# Patient Record
Sex: MALE | Race: WHITE | NOT HISPANIC OR LATINO | Employment: UNEMPLOYED | URBAN - METROPOLITAN AREA
[De-identification: names, ages, dates, MRNs, and addresses within clinical notes are randomized per-mention and may not be internally consistent; named-entity substitution may affect disease eponyms.]

---

## 2018-06-14 ENCOUNTER — HOSPITAL ENCOUNTER (EMERGENCY)
Facility: HOSPITAL | Age: 38
Discharge: HOME | End: 2018-06-14
Attending: EMERGENCY MEDICINE

## 2018-06-14 VITALS
SYSTOLIC BLOOD PRESSURE: 113 MMHG | RESPIRATION RATE: 16 BRPM | DIASTOLIC BLOOD PRESSURE: 73 MMHG | BODY MASS INDEX: 25.05 KG/M2 | WEIGHT: 175 LBS | HEART RATE: 120 BPM | TEMPERATURE: 99 F | HEIGHT: 70 IN | OXYGEN SATURATION: 97 %

## 2018-06-14 DIAGNOSIS — S02.609D: Primary | ICD-10-CM

## 2018-06-14 PROCEDURE — 99283 EMERGENCY DEPT VISIT LOW MDM: CPT

## 2018-06-14 RX ORDER — HYDROCODONE BITARTRATE AND ACETAMINOPHEN 7.5; 325 MG/1; MG/1
1 TABLET ORAL EVERY 8 HOURS PRN
Qty: 15 TABLET | Refills: 0 | Status: SHIPPED | OUTPATIENT
Start: 2018-06-14 | End: 2018-06-19

## 2018-06-14 RX ORDER — IBUPROFEN 800 MG/1
800 TABLET ORAL EVERY 6 HOURS PRN
Qty: 20 TABLET | Refills: 0 | Status: SHIPPED | OUTPATIENT
Start: 2018-06-14 | End: 2023-01-08 | Stop reason: SDUPTHER

## 2018-06-14 RX ORDER — CHLORHEXIDINE GLUCONATE ORAL RINSE 1.2 MG/ML
15 SOLUTION DENTAL 2 TIMES DAILY
COMMUNITY
End: 2023-05-26

## 2018-06-14 RX ORDER — CLINDAMYCIN HYDROCHLORIDE 150 MG/1
150 CAPSULE ORAL EVERY 6 HOURS
COMMUNITY
End: 2023-05-26
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
## 2023-01-08 ENCOUNTER — HOSPITAL ENCOUNTER (EMERGENCY)
Facility: HOSPITAL | Age: 43
Discharge: HOME | End: 2023-01-08
Attending: EMERGENCY MEDICINE
Payer: COMMERCIAL

## 2023-01-08 VITALS
HEART RATE: 110 BPM | BODY MASS INDEX: 24.5 KG/M2 | RESPIRATION RATE: 18 BRPM | TEMPERATURE: 99 F | WEIGHT: 175 LBS | DIASTOLIC BLOOD PRESSURE: 80 MMHG | HEIGHT: 71 IN | OXYGEN SATURATION: 99 % | SYSTOLIC BLOOD PRESSURE: 125 MMHG

## 2023-01-08 DIAGNOSIS — R07.9 CHEST PAIN: ICD-10-CM

## 2023-01-08 DIAGNOSIS — R07.89 CHRONIC CHEST WALL PAIN: Primary | ICD-10-CM

## 2023-01-08 DIAGNOSIS — G89.29 CHRONIC CHEST WALL PAIN: Primary | ICD-10-CM

## 2023-01-08 PROCEDURE — 87389 HIV-1 AG W/HIV-1&-2 AB AG IA: CPT | Performed by: EMERGENCY MEDICINE

## 2023-01-08 PROCEDURE — 99284 EMERGENCY DEPT VISIT MOD MDM: CPT

## 2023-01-08 PROCEDURE — 36415 COLL VENOUS BLD VENIPUNCTURE: CPT | Performed by: EMERGENCY MEDICINE

## 2023-01-08 PROCEDURE — 86803 HEPATITIS C AB TEST: CPT | Performed by: EMERGENCY MEDICINE

## 2023-01-08 PROCEDURE — 93005 ELECTROCARDIOGRAM TRACING: CPT

## 2023-01-08 PROCEDURE — 93010 EKG 12-LEAD: ICD-10-PCS | Mod: ,,, | Performed by: INTERNAL MEDICINE

## 2023-01-08 PROCEDURE — 93010 ELECTROCARDIOGRAM REPORT: CPT | Mod: ,,, | Performed by: INTERNAL MEDICINE

## 2023-01-08 RX ORDER — IBUPROFEN 800 MG/1
800 TABLET ORAL EVERY 6 HOURS PRN
Qty: 20 TABLET | Refills: 0 | Status: SHIPPED | OUTPATIENT
Start: 2023-01-08 | End: 2023-03-14

## 2023-01-08 NOTE — LETTER
January 16, 2023         [address] [place] [id number]  Phone: [phone]       Patient: Moses Chua   YOB: 1980  Date of Visit: 01/16/2023    We have been attempting to reach you regarding results from a recent Emergency Room visit. Please call us at the number listed above to discuss these results.    Thank you

## 2023-01-09 LAB
HCV AB SERPL QL IA: REACTIVE
HIV 1+2 AB+HIV1 P24 AG SERPL QL IA: NORMAL

## 2023-01-09 NOTE — ED PROVIDER NOTES
Encounter Date: 1/8/2023    SCRIBE #1 NOTE: I, Kory Garayh, am scribing for, and in the presence of,  Reynodl Venegas MD.     History     Chief Complaint   Patient presents with    Chest Pain     Pt fell out of bed and landed on heater injuring sternum in November. States she was given abx and anti inflammatories at [facility]. Pain is still present today.      Time seen by provider: 6:15 PM on 01/08/2023    Moses Chua is a 42 y.o. male who presents to the ED with an onset of sternal pain that began a month and a half ago after falling out of his bed and landing on his heater. He has been seen for the injury before and states the pain has persisted. The patient denies any other symptoms at this time. PMHx of IV drug use and multiple traumas. There is no pertinent PSHx.    The history is provided by the patient.   Review of patient's allergies indicates:   Allergen Reactions    Amoxicillin      Past Medical History:   Diagnosis Date    Depression      Past Surgical History:   Procedure Laterality Date    FRACTURE SURGERY      left arm with hardware placement     History reviewed. No pertinent family history.  Social History     Tobacco Use    Smoking status: Every Day     Packs/day: 1.00     Types: Cigarettes   Substance Use Topics    Alcohol use: No    Drug use: Yes     Frequency: 7.0 times per week     Types: Marijuana, IV     Comment: heroin     Review of Systems   Constitutional:  Negative for fever.   HENT:  Negative for sore throat.    Respiratory:  Negative for shortness of breath.    Cardiovascular:  Negative for chest pain.   Gastrointestinal:  Negative for nausea.   Genitourinary:  Negative for dysuria.   Musculoskeletal:  Positive for arthralgias. Negative for back pain.   Skin:  Negative for rash.   Neurological:  Negative for weakness.   Hematological:  Does not bruise/bleed easily.     Physical Exam     Initial Vitals [01/08/23 1747]   BP Pulse Resp Temp SpO2   125/80 110 18 98.5 °F (36.9 °C) 99 %       MAP       --         Physical Exam    Nursing note and vitals reviewed.  Constitutional: Vital signs are normal. He appears well-developed and well-nourished.  Non-toxic appearance. No distress.   HENT:   Head: Normocephalic and atraumatic.   Eyes: EOM are normal. Pupils are equal, round, and reactive to light.   Neck: Neck supple. No JVD present.   Normal range of motion.  Cardiovascular:  Normal rate, regular rhythm, normal heart sounds and intact distal pulses.     Exam reveals no gallop and no friction rub.       No murmur heard.  Pulmonary/Chest: Breath sounds normal. He has no wheezes. He has no rhonchi. He has no rales. He exhibits tenderness (sternal).   Equal breath sounds bilaterally.   Abdominal: Abdomen is soft. Bowel sounds are normal. There is no abdominal tenderness. There is no rebound and no guarding.   Musculoskeletal:         General: Normal range of motion.      Cervical back: Normal range of motion and neck supple. No rigidity.      Comments: Track sahara across arms bilaterally with no bruising.     Neurological: He is alert and oriented to person, place, and time. He has normal strength and normal reflexes. No cranial nerve deficit or sensory deficit. He exhibits normal muscle tone. Coordination normal. GCS eye subscore is 4. GCS verbal subscore is 5. GCS motor subscore is 6.   Skin: Skin is warm and dry.   Psychiatric: He has a normal mood and affect. His speech is normal and behavior is normal. He is not actively hallucinating.       ED Course   Procedures  Labs Reviewed   HEPATITIS C ANTIBODY - Abnormal; Notable for the following components:       Result Value    Hepatitis C Ab Reactive (*)     All other components within normal limits    Narrative:     Release to patient->Immediate   HIV 1 / 2 ANTIBODY    Narrative:     Release to patient->Immediate     EKG Readings: (Independently Interpreted)   Sinus tachycardia at rate of 106 bpm with normal axis and normal intervals. No acute ST  segment changes.      ECG Results              EKG 12-lead (In process)  Result time 01/09/23 09:12:04      In process by Interface, Lab In [facility] (01/09/23 09:12:04)                   Narrative:    Test Reason : R07.9,    Vent. Rate : 106 BPM     Atrial Rate : 106 BPM     P-R Int : 148 ms          QRS Dur : 078 ms      QT Int : 330 ms       P-R-T Axes : 084 063 035 degrees     QTc Int : 438 ms    Sinus tachycardia  Otherwise normal ECG  No previous ECGs available    Referred By: [id number]   SELF           Confirmed By:                                   Imaging Results    None          Medications - No data to display  Medical Decision Making:   History:   Old Medical Records: I decided to obtain old medical records.  Initial Assessment:   43 yo man with sternal pain that began a month and a half ago after falling out of his bed and landing on his heater. He has been seen for the injury before and states the pain has persisted. The patient denies any other symptoms at this time. His chest pain is completely reproducible and seems musculoskeletal. EKG is normal with mild sinus tachycardia 106 bpm. No pulses alterans or hypotension to suspect cardiac tamponade. No hypoxia, resp distress, and he has equal lung sounds. I doubt tension pneumothorax. Pain is chronic and no acute ischemia on EKg. Doubt ACS. Suggest NSAIDS and close PCP fu.   Independently Interpreted Test(s):   I have ordered and independently interpreted EKG Reading(s) - see prior notes  Clinical Tests:   Medical Tests: Ordered and Reviewed        Scribe Attestation:   Scribe #1: I performed the above scribed service and the documentation accurately describes the services I performed. I attest to the accuracy of the note.            I, Theo Cheema, personally performed the services described in this documentation. All medical record entries made by the scribe were at my direction and in my presence.  I have reviewed the chart and agree that the  record reflects my personal performance and is accurate and complete. Reynold Venegas MD.  11:43 AM 01/10/2023       DISCLAIMER: This note was prepared with [employer] Direct voice recognition transcription software. Garbled syntax, mangled pronouns, and other bizarre constructions may be attributed to that software system.         Clinical Impression:   Final diagnoses:  [R07.9] Chest pain  [R07.89, G89.29] Chronic chest wall pain (Primary)        ED Disposition Condition    Discharge Stable          ED Prescriptions       Medication Sig Dispense Start Date End Date Auth. Provider    ibuprofen (ADVIL,MOTRIN) 800 MG tablet Take 1 tablet (800 mg total) by mouth every 6 (six) hours as needed. 20 tablet 1/8/2023 -- Reynold Venegas MD          Follow-up Information       Follow up With Specialties Details Why Contact [facility]  Schedule an appointment as soon as possible for a visit   [address] [place] [address]  [phone]    [facility] Emergency Medicine  As needed, If symptoms worsen [address] [place] [id number] [phone]             Reynold Venegas MD  01/10/23 3401

## 2023-01-24 DIAGNOSIS — R76.8 HEPATITIS C ANTIBODY TEST POSITIVE: Primary | ICD-10-CM

## 2023-02-15 ENCOUNTER — LAB VISIT (OUTPATIENT)
Dept: LAB | Facility: HOSPITAL | Age: 43
End: 2023-02-15
Attending: PHYSICIAN ASSISTANT
Payer: COMMERCIAL

## 2023-02-15 DIAGNOSIS — R76.8 HEPATITIS C ANTIBODY TEST POSITIVE: ICD-10-CM

## 2023-02-15 PROCEDURE — 87522 HEPATITIS C REVRS TRNSCRPJ: CPT | Performed by: PHYSICIAN ASSISTANT

## 2023-02-15 PROCEDURE — 36415 COLL VENOUS BLD VENIPUNCTURE: CPT | Performed by: PHYSICIAN ASSISTANT

## 2023-02-16 LAB
HCV RNA SERPL NAA+PROBE-LOG IU: 7.06 LOGIU/ML
HCV RNA SERPL QL NAA+PROBE: DETECTED
HCV RNA SPEC NAA+PROBE-ACNC: ABNORMAL IU/ML

## 2023-03-14 ENCOUNTER — OFFICE VISIT (OUTPATIENT)
Dept: FAMILY MEDICINE | Facility: CLINIC | Age: 43
End: 2023-03-14
Payer: COMMERCIAL

## 2023-03-14 VITALS
SYSTOLIC BLOOD PRESSURE: 102 MMHG | HEART RATE: 87 BPM | DIASTOLIC BLOOD PRESSURE: 70 MMHG | OXYGEN SATURATION: 95 % | HEIGHT: 71 IN | TEMPERATURE: 98 F | BODY MASS INDEX: 27.19 KG/M2 | WEIGHT: 194.25 LBS

## 2023-03-14 DIAGNOSIS — B17.10 ACUTE HEPATITIS C VIRUS INFECTION WITHOUT HEPATIC COMA: ICD-10-CM

## 2023-03-14 DIAGNOSIS — Z00.00 ENCOUNTER FOR MEDICAL EXAMINATION TO ESTABLISH CARE: Primary | ICD-10-CM

## 2023-03-14 PROCEDURE — 3074F SYST BP LT 130 MM HG: CPT | Mod: CPTII,,, | Performed by: FAMILY MEDICINE

## 2023-03-14 PROCEDURE — 3008F BODY MASS INDEX DOCD: CPT | Mod: CPTII,,, | Performed by: FAMILY MEDICINE

## 2023-03-14 PROCEDURE — 99203 PR OFFICE/OUTPT VISIT, NEW, LEVL III, 30-44 MIN: ICD-10-PCS | Mod: ,,, | Performed by: FAMILY MEDICINE

## 2023-03-14 PROCEDURE — 3078F PR MOST RECENT DIASTOLIC BLOOD PRESSURE < 80 MM HG: ICD-10-PCS | Mod: CPTII,,, | Performed by: FAMILY MEDICINE

## 2023-03-14 PROCEDURE — 3008F PR BODY MASS INDEX (BMI) DOCUMENTED: ICD-10-PCS | Mod: CPTII,,, | Performed by: FAMILY MEDICINE

## 2023-03-14 PROCEDURE — 3078F DIAST BP <80 MM HG: CPT | Mod: CPTII,,, | Performed by: FAMILY MEDICINE

## 2023-03-14 PROCEDURE — 1159F PR MEDICATION LIST DOCUMENTED IN MEDICAL RECORD: ICD-10-PCS | Mod: CPTII,,, | Performed by: FAMILY MEDICINE

## 2023-03-14 PROCEDURE — 1159F MED LIST DOCD IN RCRD: CPT | Mod: CPTII,,, | Performed by: FAMILY MEDICINE

## 2023-03-14 PROCEDURE — 99203 OFFICE O/P NEW LOW 30 MIN: CPT | Mod: ,,, | Performed by: FAMILY MEDICINE

## 2023-03-14 PROCEDURE — 3074F PR MOST RECENT SYSTOLIC BLOOD PRESSURE < 130 MM HG: ICD-10-PCS | Mod: CPTII,,, | Performed by: FAMILY MEDICINE

## 2023-03-14 NOTE — PROGRESS NOTES
Subjective:       Patient ID: Moses Negron is a 42 y.o. male.    Chief Complaint: Establish Care (Pt here to [facility] care/ Went to ER 1-8-23, here for f/u and recently diagnosed with Hep C)    Mr. NEGRON is a 42-year-old male who presents today to establish care.  He has been seen in the ER multiple issues including abscesses of the forearm numerous times in 2022, as well as sternal chest pain after falling out of bed and landing on a heater.  His last visit to the ER for sternal pain was in January 8th of 2023.  At that ER visit he was tested for hep C and was found to be positive.  He is here today to get a referral to a hepatologist.    He has a past medical history of extensive IV drug use with track marks on bilateral arms and forearms some of which appear fairly recent.  When asked what these marks were on his arms he told me bug bites.  I noted that they looked like track marks from previous drug use and he said yes he used IV drugs in the past.  He stated he has not done heroin in approximately 1 month.    Of concern is that there are no labs on file to evaluate this patient nor chest x-ray, EKG, etc. I will order an EKG a chest x-ray, as the patient is still having significant chest pain due to the contusion he suffered last fall.  However, I am worried that this may have some relation to his chronic drug use and recent infections so we will do an EKG also as well as routine labs to evaluate for pericarditis, etcetera    Review of Systems   Cardiovascular:  Positive for chest pain.        Chronic sternal chest pain   Musculoskeletal:  Positive for arthralgias and myalgias.   Psychiatric/Behavioral:          Chronic iv drug use     There is no problem list on file for this patient.      Objective:      Physical Exam  HENT:      Head: Normocephalic and atraumatic.      Nose: Nose normal.   Eyes:      Extraocular Movements: Extraocular movements intact.      Pupils: Pupils are equal, round, and reactive to light.  "  Cardiovascular:      Rate and Rhythm: Normal rate and regular rhythm.   Pulmonary:      Effort: Pulmonary effort is normal.      Breath sounds: Normal breath sounds.   Skin:     Findings: Lesion present.      Comments: Bilateral arms and forearms with extensive track marks from iv drug use   Neurological:      Mental Status: He is alert.       Lab Results   Component Value Date    WBC 14.90 (H) 07/15/2014    HGB 16.1 07/15/2014    HCT 48.3 07/15/2014     07/15/2014    ALT 55 (H) 07/15/2014    AST 20 07/15/2014     07/15/2014    K 4.6 07/15/2014    CL 98 07/15/2014    CREATININE 1.0 07/15/2014    BUN 18 07/15/2014    CO2 25 07/15/2014     The ASCVD Risk score (Ron SAEZ, et al., 2019) failed to calculate for the following reasons:    Cannot find a previous HDL lab    Cannot find a previous total cholesterol lab  Visit Vitals  /70 (BP Location: Left arm, Patient Position: Sitting, BP Method: Large (Manual))   Pulse 87   Temp 98.1 °F (36.7 °C)   Ht 5' 11" (1.803 m)   Wt 88.1 kg (194 lb 3.6 oz)   SpO2 95%   BMI 27.09 kg/m²      Assessment:       1. Encounter for medical examination to establish care    2. Acute hepatitis C virus infection without hepatic coma        Plan:       1. Encounter for medical examination to establish care  -     Lipid Panel; Future; Expected date: 03/14/2023  -     CBC Auto Differential; Future; Expected date: 03/14/2023  -     Comprehensive Metabolic Panel; Future; Expected date: 03/14/2023  -     Hemoglobin A1C; Future; Expected date: 03/14/2023  -     TSH; Future; Expected date: 03/14/2023  -     PSA, Screening; Future; Expected date: 03/14/2023  -     IN OFFICE EKG 12-LEAD (to Muse)    2. Acute hepatitis C virus infection without hepatic coma  -     Ambulatory referral/consult to Hepatology; Future; Expected date: 03/21/2023       Follow up in about 3 months (around 6/14/2023), or if symptoms worsen or fail to improve.           "

## 2023-03-15 ENCOUNTER — TELEPHONE (OUTPATIENT)
Dept: HEPATOLOGY | Facility: CLINIC | Age: 43
End: 2023-03-15
Payer: COMMERCIAL

## 2023-03-15 NOTE — TELEPHONE ENCOUNTER
Dr. Maricarmen Bernard ordered that patient be scheduled for a hepatology consult visit for hep c.  Patient quant positive.  Attempt made to reach patient to setup consult visit with PA Scheuermann.  DILSHADM asking that patient call hepatology back for scheduling.

## 2023-03-21 ENCOUNTER — TELEPHONE (OUTPATIENT)
Dept: HEPATOLOGY | Facility: CLINIC | Age: 43
End: 2023-03-21
Payer: COMMERCIAL

## 2023-03-21 NOTE — TELEPHONE ENCOUNTER
----- Message from Nayely Ching sent at 3/21/2023  3:42 PM CDT -----  Regarding: FW: Call Back  Hi Rhoda,    This is Lillie's patient returning a call back to you . Patient (MRN# [id number]) Moses Harshil.    Thanks,  Kajal  ----- Message -----  From: Alexandrea Gatica  Sent: 3/21/2023  11:15 AM CDT  To: [facility] Hepatology Scheduling  Subject: Call Back                                        Pt returning call to Nayely. They need to schedule an appt. Best time to reach pt would be after 4 pm.      Moses @ [phone]

## 2023-05-02 ENCOUNTER — TELEPHONE (OUTPATIENT)
Dept: HEPATOLOGY | Facility: CLINIC | Age: 43
End: 2023-05-02
Payer: COMMERCIAL

## 2023-05-02 DIAGNOSIS — B18.2 CHRONIC HEPATITIS C WITHOUT HEPATIC COMA: Primary | ICD-10-CM

## 2023-05-02 NOTE — TELEPHONE ENCOUNTER
Pt arrived over 45 min late for appt  (New pt for HCV)    Unable to accommodate him today  Will r/s appt w/ pre appt labs / imaging    Orders Placed This Encounter   Procedures    FibroScan (Vibration Controlled Transient Elastography)    US Abdomen Limited    CBC Without Differential    Comprehensive Metabolic Panel    Protime-INR    Hepatitis C Genotype    Hepatitis B Surface Antigen    Hepatitis B Surface Ab, Qualitative    Hepatitis B Core Antibody, Total    Hepatitis A antibody, IgG

## 2023-05-02 NOTE — TELEPHONE ENCOUNTER
LVM appt reminder on 5/1/23.  Patient was a no-show for scheduled appt with PA Scheuermann on 5/2/23.  Letter sent asking that he call hepatology for rescheduling.

## 2023-05-12 NOTE — TELEPHONE ENCOUNTER
I spoke with patient.  US and labs scheduled 5/20/23 and appt with PA Scheuermann and fibroscan scheduled 5/26/23.

## 2023-05-24 ENCOUNTER — HOSPITAL ENCOUNTER (OUTPATIENT)
Dept: RADIOLOGY | Facility: HOSPITAL | Age: 43
Discharge: HOME | End: 2023-05-24
Attending: PHYSICIAN ASSISTANT
Payer: COMMERCIAL

## 2023-05-24 DIAGNOSIS — B18.2 CHRONIC HEPATITIS C WITHOUT HEPATIC COMA: ICD-10-CM

## 2023-05-24 PROCEDURE — 76705 US ABDOMEN LIMITED: ICD-10-PCS | Mod: 26,,, | Performed by: RADIOLOGY

## 2023-05-24 PROCEDURE — 76705 ECHO EXAM OF ABDOMEN: CPT | Mod: TC

## 2023-05-24 PROCEDURE — 76705 ECHO EXAM OF ABDOMEN: CPT | Mod: 26,,, | Performed by: RADIOLOGY

## 2023-05-26 ENCOUNTER — OFFICE VISIT (OUTPATIENT)
Dept: HEPATOLOGY | Facility: CLINIC | Age: 43
End: 2023-05-26
Payer: COMMERCIAL

## 2023-05-26 ENCOUNTER — PROCEDURE VISIT (OUTPATIENT)
Dept: HEPATOLOGY | Facility: CLINIC | Age: 43
End: 2023-05-26
Payer: COMMERCIAL

## 2023-05-26 VITALS — BODY MASS INDEX: 27.16 KG/M2 | HEIGHT: 71 IN | WEIGHT: 194 LBS

## 2023-05-26 DIAGNOSIS — B17.10 ACUTE HEPATITIS C VIRUS INFECTION WITHOUT HEPATIC COMA: ICD-10-CM

## 2023-05-26 DIAGNOSIS — B18.2 CHRONIC HEPATITIS C WITHOUT HEPATIC COMA: ICD-10-CM

## 2023-05-26 DIAGNOSIS — R76.8 HEPATITIS B SURFACE ANTIGEN POSITIVE: ICD-10-CM

## 2023-05-26 DIAGNOSIS — B18.2 CHRONIC HEPATITIS C WITHOUT HEPATIC COMA: Primary | ICD-10-CM

## 2023-05-26 PROCEDURE — 1160F PR REVIEW ALL MEDS BY PRESCRIBER/CLIN PHARMACIST DOCUMENTED: ICD-10-PCS | Mod: CPTII,S$GLB,, | Performed by: PHYSICIAN ASSISTANT

## 2023-05-26 PROCEDURE — 3044F PR MOST RECENT HEMOGLOBIN A1C LEVEL <7.0%: ICD-10-PCS | Mod: CPTII,S$GLB,, | Performed by: PHYSICIAN ASSISTANT

## 2023-05-26 PROCEDURE — 1160F RVW MEDS BY RX/DR IN RCRD: CPT | Mod: CPTII,S$GLB,, | Performed by: PHYSICIAN ASSISTANT

## 2023-05-26 PROCEDURE — 3044F HG A1C LEVEL LT 7.0%: CPT | Mod: CPTII,S$GLB,, | Performed by: PHYSICIAN ASSISTANT

## 2023-05-26 PROCEDURE — 99204 OFFICE O/P NEW MOD 45 MIN: CPT | Mod: S$GLB,,, | Performed by: PHYSICIAN ASSISTANT

## 2023-05-26 PROCEDURE — 76981 FIBROSCAN (VIBRATION CONTROLLED TRANSIENT ELASTOGRAPHY): ICD-10-PCS | Mod: S$GLB,,, | Performed by: PHYSICIAN ASSISTANT

## 2023-05-26 PROCEDURE — 3008F PR BODY MASS INDEX (BMI) DOCUMENTED: ICD-10-PCS | Mod: CPTII,S$GLB,, | Performed by: PHYSICIAN ASSISTANT

## 2023-05-26 PROCEDURE — 1159F MED LIST DOCD IN RCRD: CPT | Mod: CPTII,S$GLB,, | Performed by: PHYSICIAN ASSISTANT

## 2023-05-26 PROCEDURE — 99999 PR PBB SHADOW E&M-EST. PATIENT-LVL III: ICD-10-PCS | Mod: PBBFAC,,, | Performed by: PHYSICIAN ASSISTANT

## 2023-05-26 PROCEDURE — 76981 USE PARENCHYMA: CPT | Mod: S$GLB,,, | Performed by: PHYSICIAN ASSISTANT

## 2023-05-26 PROCEDURE — 3008F BODY MASS INDEX DOCD: CPT | Mod: CPTII,S$GLB,, | Performed by: PHYSICIAN ASSISTANT

## 2023-05-26 PROCEDURE — 1159F PR MEDICATION LIST DOCUMENTED IN MEDICAL RECORD: ICD-10-PCS | Mod: CPTII,S$GLB,, | Performed by: PHYSICIAN ASSISTANT

## 2023-05-26 PROCEDURE — 99204 PR OFFICE/OUTPT VISIT, NEW, LEVL IV, 45-59 MIN: ICD-10-PCS | Mod: S$GLB,,, | Performed by: PHYSICIAN ASSISTANT

## 2023-05-26 PROCEDURE — 99999 PR PBB SHADOW E&M-EST. PATIENT-LVL III: CPT | Mod: PBBFAC,,, | Performed by: PHYSICIAN ASSISTANT
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
## 2023-05-29 ENCOUNTER — TELEPHONE (OUTPATIENT)
Dept: HEPATOLOGY | Facility: CLINIC | Age: 43
End: 2023-05-29
Payer: COMMERCIAL

## 2023-05-29 ENCOUNTER — TELEPHONE (OUTPATIENT)
Dept: FAMILY MEDICINE | Facility: CLINIC | Age: 43
End: 2023-05-29
Payer: COMMERCIAL

## 2023-05-29 NOTE — TELEPHONE ENCOUNTER
Attempt made to reach patient.  Unable to connect to his number.  Letter sent asking that he contact hepatology for scheduling.

## 2023-05-29 NOTE — TELEPHONE ENCOUNTER
----- Message from Jennifer B. Scheuermann, PA sent at 5/26/2023  5:01 PM CDT -----  Pls schedule labs. Schedule f/u visit w/ me in [place]. Needs to be after lab results available.

## 2023-05-31 ENCOUNTER — OFFICE VISIT (OUTPATIENT)
Dept: FAMILY MEDICINE | Facility: CLINIC | Age: 43
End: 2023-05-31
Payer: COMMERCIAL

## 2023-05-31 VITALS
RESPIRATION RATE: 18 BRPM | HEART RATE: 95 BPM | BODY MASS INDEX: 27.72 KG/M2 | DIASTOLIC BLOOD PRESSURE: 72 MMHG | WEIGHT: 198 LBS | OXYGEN SATURATION: 99 % | HEIGHT: 71 IN | SYSTOLIC BLOOD PRESSURE: 130 MMHG

## 2023-05-31 DIAGNOSIS — R73.03 PREDIABETES: ICD-10-CM

## 2023-05-31 DIAGNOSIS — E66.3 OVERWEIGHT (BMI 25.0-29.9): ICD-10-CM

## 2023-05-31 DIAGNOSIS — R79.89 ELEVATED TSH: Primary | ICD-10-CM

## 2023-05-31 PROBLEM — S02.601A CLOSED FRACTURE OF RIGHT SIDE OF MANDIBULAR BODY: Status: ACTIVE | Noted: 2018-06-11

## 2023-05-31 PROBLEM — F19.10 POLYSUBSTANCE ABUSE: Status: ACTIVE | Noted: 2018-06-11

## 2023-05-31 PROCEDURE — 3078F PR MOST RECENT DIASTOLIC BLOOD PRESSURE < 80 MM HG: ICD-10-PCS | Mod: CPTII,,, | Performed by: FAMILY MEDICINE

## 2023-05-31 PROCEDURE — 3044F PR MOST RECENT HEMOGLOBIN A1C LEVEL <7.0%: ICD-10-PCS | Mod: CPTII,,, | Performed by: FAMILY MEDICINE

## 2023-05-31 PROCEDURE — 3008F BODY MASS INDEX DOCD: CPT | Mod: CPTII,,, | Performed by: FAMILY MEDICINE

## 2023-05-31 PROCEDURE — 3008F PR BODY MASS INDEX (BMI) DOCUMENTED: ICD-10-PCS | Mod: CPTII,,, | Performed by: FAMILY MEDICINE

## 2023-05-31 PROCEDURE — 1160F PR REVIEW ALL MEDS BY PRESCRIBER/CLIN PHARMACIST DOCUMENTED: ICD-10-PCS | Mod: CPTII,,, | Performed by: FAMILY MEDICINE

## 2023-05-31 PROCEDURE — 3075F SYST BP GE 130 - 139MM HG: CPT | Mod: CPTII,,, | Performed by: FAMILY MEDICINE

## 2023-05-31 PROCEDURE — 1160F RVW MEDS BY RX/DR IN RCRD: CPT | Mod: CPTII,,, | Performed by: FAMILY MEDICINE

## 2023-05-31 PROCEDURE — 99212 PR OFFICE/OUTPT VISIT, EST, LEVL II, 10-19 MIN: ICD-10-PCS | Mod: ,,, | Performed by: FAMILY MEDICINE

## 2023-05-31 PROCEDURE — 99212 OFFICE O/P EST SF 10 MIN: CPT | Mod: ,,, | Performed by: FAMILY MEDICINE

## 2023-05-31 PROCEDURE — 3044F HG A1C LEVEL LT 7.0%: CPT | Mod: CPTII,,, | Performed by: FAMILY MEDICINE

## 2023-05-31 PROCEDURE — 1159F PR MEDICATION LIST DOCUMENTED IN MEDICAL RECORD: ICD-10-PCS | Mod: CPTII,,, | Performed by: FAMILY MEDICINE

## 2023-05-31 PROCEDURE — 3075F PR MOST RECENT SYSTOLIC BLOOD PRESS GE 130-139MM HG: ICD-10-PCS | Mod: CPTII,,, | Performed by: FAMILY MEDICINE

## 2023-05-31 PROCEDURE — 3078F DIAST BP <80 MM HG: CPT | Mod: CPTII,,, | Performed by: FAMILY MEDICINE

## 2023-05-31 PROCEDURE — 1159F MED LIST DOCD IN RCRD: CPT | Mod: CPTII,,, | Performed by: FAMILY MEDICINE

## 2023-05-31 NOTE — PROGRESS NOTES
Subjective:       Patient ID: Moses Chua is a 42 y.o. male.    Chief Complaint: Follow-up (Pt presents to the clinic for f/u on labs)    This patient is new to me.  Moses Chua presents to the clinic today for lab review. Previous blood work reviewed with patient in office today. Explained that A1c of 5.9, this is in prediabetic range. This is usually managed with diet and exercise and continue to monitor every 6 months.   TSH is elevated and suggested to repeat this in 3 months as well as a T4 level to assess thyroid function.   Other lab work will be reviewed by hepatology, PA Scheuermann.  Patient educated on plan of care, verbalized understanding.    Review of Systems   Constitutional:  Negative for activity change, appetite change, chills, diaphoresis and fever.   HENT:  Negative for congestion, ear pain, postnasal drip, sinus pressure, sneezing and sore throat.    Eyes:  Negative for pain, discharge, redness and itching.   Respiratory:  Negative for apnea, cough, chest tightness, shortness of breath and wheezing.    Cardiovascular:  Negative for chest pain and leg swelling.   Gastrointestinal:  Negative for abdominal distention, abdominal pain, constipation, diarrhea, nausea and vomiting.   Genitourinary:  Negative for difficulty urinating, dysuria, flank pain and frequency.   Skin:  Negative for color change, rash and wound.   Neurological:  Negative for dizziness.     Patient Active Problem List   Diagnosis    Closed fracture of right side of mandibular body    Humerus fracture    Polysubstance abuse       Objective:      Physical Exam  Vitals reviewed.   Constitutional:       General: He is not in acute distress.     Appearance: Normal appearance. He is well-developed.   HENT:      Head: Normocephalic.      Nose: Nose normal.   Eyes:      Conjunctiva/sclera: Conjunctivae normal.      Pupils: Pupils are equal, round, and reactive to light.   Cardiovascular:      Rate and Rhythm: Normal rate.   Pulmonary:  "     Effort: Pulmonary effort is normal. No respiratory distress.   Musculoskeletal:      Cervical back: Normal range of motion and neck supple.   Skin:     General: Skin is warm and dry.      Findings: No rash.   Neurological:      Mental Status: He is alert and oriented to person, place, and time.   Psychiatric:         Behavior: Behavior normal.       Lab Results   Component Value Date    WBC 6.75 05/24/2023    HGB 11.7 (L) 05/24/2023    HCT 37.7 (L) 05/24/2023     (H) 05/24/2023    CHOL 102 (L) 05/24/2023    TRIG 56 05/24/2023    HDL 24 (L) 05/24/2023    ALT 38 05/24/2023    AST 29 05/24/2023     05/24/2023    K 4.4 05/24/2023     05/24/2023    CREATININE 0.8 05/24/2023    BUN 18 05/24/2023    CO2 25 05/24/2023    TSH 8.157 (H) 05/24/2023    PSA 0.10 05/24/2023    INR 1.1 05/24/2023    HGBA1C 5.9 (H) 05/24/2023     The ASCVD Risk score (Ron SAEZ, et al., 2019) failed to calculate for the following reasons:    The valid total cholesterol range is 130 to 320 mg/dL  Visit Vitals  /72 (BP Location: Right arm, Patient Position: Sitting, BP Method: Medium (Manual))   Pulse 95   Resp 18   Ht 5' 11" (1.803 m)   Wt 89.8 kg (197 lb 15.6 oz)   SpO2 99%   BMI 27.61 kg/m²      Assessment:       1. Elevated TSH    2. Prediabetes    3. Overweight (BMI 25.0-29.9)        Plan:       Moses was seen today for follow-up.    Diagnoses and all orders for this visit:    Elevated TSH  -     TSH; Future  -     T4, free; Future  -     T3; Future  Discussed repeat in 3 months  Continue medications as prescribed.  Follow up with PCP     Prediabetes  Discussed diet and exercise to control  Continue medications as prescribed.  Follow up with PCP     Overweight (BMI 25.0-29.9)  Body mass index is 27.61 kg/m².  Continue healthy diet and regular exercise as tolerated.  Continue medications as prescribed.  Follow up with PCP        Follow up in about 6 months (around 11/30/2023), or if symptoms worsen or fail to " improve.      Future Appointments       Date Provider Specialty Appt Notes    8/31/2023  Lab .     12/1/2023 Maricarmen Bernard MD Family Medicine 6m f/u

## 2023-06-01 ENCOUNTER — TELEPHONE (OUTPATIENT)
Dept: HEPATOLOGY | Facility: CLINIC | Age: 43
End: 2023-06-01
Payer: COMMERCIAL

## 2023-06-01 NOTE — TELEPHONE ENCOUNTER
I spoke with patient and msg from PA Scheuermann relayed.  Lab draw scheduled 6/2/23 and f/u visit scheduled 7/18/23; appt reminder notice mailed.

## 2023-06-01 NOTE — TELEPHONE ENCOUNTER
Pt seen in ofc 5/26 w/ several labs ordered:   HEPATITIS B VIRAL DNA, QUANTITATIVE    Hepatitis B Core Antibody, IgM    Hepatitis B Surface Antigen    Hepatitis B e Antigen    Hepatitis B e antibody     Appears these are scheduled in Aug??? But I'm not sure why  I don't recommend he wait that long. Pls see if labs can be done sooner    Pls schedule labs to be done some time soon  Also needs f/u visit w/ me after results are avail ([place] is fine if easier for him)    thanks

## 2023-06-22 NOTE — PROCEDURES
FibroScan (Vibration Controlled Transient Elastography)    Date/Time: 5/26/2023 2:45 PM  Performed by: Jennifer B. Scheuermann, PA  Authorized by: Jennifer B. Scheuermann, PA     Diagnosis:  HCV    Probe:  M    Universal Protocol: Patient's identity, procedure and site were verified, confirmatory pause was performed.  Discussed procedure including risks and potential complications.  Questions answered.  Patient verbalizes understanding and wishes to proceed with VCTE.     Procedure: After providing explanations of the procedure, patient was placed in the supine position with right arm in maximum abduction to allow optimal exposure of right lateral abdomen.  Patient was briefly assessed, Testing was performed in the mid-axillary location, 50Hz Shear Wave pulses were applied and the resulting Shear Wave and Propagation Speed detected with a 3.5 MHz ultrasonic signal, using the FibroScan probe, Skin to liver capsule distance and liver parenchyma were accessed during the entire examination with the FibroScan probe, Patient was instructed to breathe normally and to abstain from sudden movements during the procedure, allowing for random measurements of liver stiffness. At least 10 Shear Waves were produced, Individual measurements of each Shear Wave were calculated.  Patient tolerated the procedure well with no complications.  Meets discharge criteria as was dismissed.  Rates pain 0 out of 10.  Patient will follow up with ordering provider to review results.    Findings  Median liver stiffness score:  3.3  CAP Reading: dB/m:  226    IQR/med %:  6  Interpretation  Fibrosis interpretation is based on medial liver stiffness - Kilopascal (kPa).    Fibrosis Stage:  F 0-1  Steatosis interpretation is based on controlled attenuation parameter - (dB/m).    Steatosis Grade:  <S1

## 2023-07-18 ENCOUNTER — OFFICE VISIT (OUTPATIENT)
Dept: HEPATOLOGY | Facility: CLINIC | Age: 43
End: 2023-07-18
Payer: COMMERCIAL

## 2023-07-18 ENCOUNTER — LAB VISIT (OUTPATIENT)
Dept: LAB | Facility: HOSPITAL | Age: 43
End: 2023-07-18
Attending: PHYSICIAN ASSISTANT
Payer: COMMERCIAL

## 2023-07-18 VITALS — WEIGHT: 197.06 LBS | BODY MASS INDEX: 28.21 KG/M2 | HEIGHT: 70 IN

## 2023-07-18 DIAGNOSIS — R76.8 HEPATITIS B SURFACE ANTIGEN POSITIVE: ICD-10-CM

## 2023-07-18 DIAGNOSIS — B18.2 CHRONIC HEPATITIS C WITHOUT HEPATIC COMA: ICD-10-CM

## 2023-07-18 DIAGNOSIS — B18.2 CHRONIC HEPATITIS C WITHOUT HEPATIC COMA: Primary | ICD-10-CM

## 2023-07-18 PROCEDURE — 87340 HEPATITIS B SURFACE AG IA: CPT | Performed by: PHYSICIAN ASSISTANT

## 2023-07-18 PROCEDURE — 3044F HG A1C LEVEL LT 7.0%: CPT | Mod: CPTII,S$GLB,, | Performed by: PHYSICIAN ASSISTANT

## 2023-07-18 PROCEDURE — 86705 HEP B CORE ANTIBODY IGM: CPT | Performed by: PHYSICIAN ASSISTANT

## 2023-07-18 PROCEDURE — 86704 HEP B CORE ANTIBODY TOTAL: CPT | Performed by: PHYSICIAN ASSISTANT

## 2023-07-18 PROCEDURE — 1160F PR REVIEW ALL MEDS BY PRESCRIBER/CLIN PHARMACIST DOCUMENTED: ICD-10-PCS | Mod: CPTII,S$GLB,, | Performed by: PHYSICIAN ASSISTANT

## 2023-07-18 PROCEDURE — 3008F PR BODY MASS INDEX (BMI) DOCUMENTED: ICD-10-PCS | Mod: CPTII,S$GLB,, | Performed by: PHYSICIAN ASSISTANT

## 2023-07-18 PROCEDURE — 36415 COLL VENOUS BLD VENIPUNCTURE: CPT | Performed by: PHYSICIAN ASSISTANT

## 2023-07-18 PROCEDURE — 87517 HEPATITIS B DNA QUANT: CPT | Performed by: PHYSICIAN ASSISTANT

## 2023-07-18 PROCEDURE — 99213 PR OFFICE/OUTPT VISIT, EST, LEVL III, 20-29 MIN: ICD-10-PCS | Mod: S$GLB,,, | Performed by: PHYSICIAN ASSISTANT

## 2023-07-18 PROCEDURE — 87350 HEPATITIS BE AG IA: CPT | Performed by: PHYSICIAN ASSISTANT

## 2023-07-18 PROCEDURE — 1160F RVW MEDS BY RX/DR IN RCRD: CPT | Mod: CPTII,S$GLB,, | Performed by: PHYSICIAN ASSISTANT

## 2023-07-18 PROCEDURE — 3008F BODY MASS INDEX DOCD: CPT | Mod: CPTII,S$GLB,, | Performed by: PHYSICIAN ASSISTANT

## 2023-07-18 PROCEDURE — 1159F PR MEDICATION LIST DOCUMENTED IN MEDICAL RECORD: ICD-10-PCS | Mod: CPTII,S$GLB,, | Performed by: PHYSICIAN ASSISTANT

## 2023-07-18 PROCEDURE — 99999 PR PBB SHADOW E&M-EST. PATIENT-LVL II: ICD-10-PCS | Mod: PBBFAC,,, | Performed by: PHYSICIAN ASSISTANT

## 2023-07-18 PROCEDURE — 99999 PR PBB SHADOW E&M-EST. PATIENT-LVL II: CPT | Mod: PBBFAC,,, | Performed by: PHYSICIAN ASSISTANT

## 2023-07-18 PROCEDURE — 99213 OFFICE O/P EST LOW 20 MIN: CPT | Mod: S$GLB,,, | Performed by: PHYSICIAN ASSISTANT

## 2023-07-18 PROCEDURE — 86706 HEP B SURFACE ANTIBODY: CPT | Performed by: PHYSICIAN ASSISTANT

## 2023-07-18 PROCEDURE — 1159F MED LIST DOCD IN RCRD: CPT | Mod: CPTII,S$GLB,, | Performed by: PHYSICIAN ASSISTANT

## 2023-07-18 PROCEDURE — 3044F PR MOST RECENT HEMOGLOBIN A1C LEVEL <7.0%: ICD-10-PCS | Mod: CPTII,S$GLB,, | Performed by: PHYSICIAN ASSISTANT

## 2023-07-18 PROCEDURE — 86707 HEPATITIS BE ANTIBODY: CPT | Performed by: PHYSICIAN ASSISTANT

## 2023-07-18 NOTE — PROGRESS NOTES
"HEPATOLOGY CLINIC VISIT NOTE - HCV clinic  CHIEF COMPLAINT: Hepatitis C     HISTORY       This is a 42 y.o. White male w/ HCV, (+) HBsAg, & substance use d/o, here for f/u    At last visit labs to clarify HBV status were ordered  - not done, "hard stick"  Willing to try again today      HCV history:  Recently diagnosed  Prior icteric illnesses: None  Risks for HCV:  Drug use (ongoing), tattoos  - Treatment naive  - Genotype - unable to be determined  - HCV RNA > 11 mill IU/mL - 2/2023    Liver staging:  FibroScan today - kPa 3.3, F0-1 (, S0)  Labs / imaging support staging    Substance use:  (+) IVDU - heroin, ongoing.  Has previously investigated detox / rehab but decided not to pursue it b/c was told he'd have to go to senior care until a bed was available.        PMH, PSH, SOCIAL HX, FAMILY HX      Reviewed in Epic  Pertinent findings:  FAMILY HX: neg for liver diease  SOCIAL HX: resides in [place]. Lives w/ mother. Works [employer]. Previously did [employer] and [employer] work  States he used to like writing but hasn't done so in years, thinks he "lost this b/c of heroin"  Alcohol - denies prolonged periods of heavy use  Drugs - ongoing heroin.      ROS: as per HPI  Occasional GERD - uses TUMS    PHYSICAL EXAM:  Friendly White male, in no acute distress; alert and oriented to person, place and time  HEENT: Sclerae anicteric.   NECK: Supple  LUNGS: Normal respiratory effort.  ABDOMEN: Flat, soft, nontender. No organomegaly or masses.   SKIN: Warm and dry. No jaundice, No obvious rashes.   EXTREMITIES: No lower extremity edema. (+) track marks on both arms. (+) tattoos  NEURO/PSYCH: Normal gate. Memory intact. Thought and speech pattern appropriate. Behavior normal. No depression or anxiety noted.    PERTINENT DIAGNOSTIC RESULTS      Lab Results   Component Value Date    WBC 6.75 05/24/2023    HGB 11.7 (L) 05/24/2023     (H) 05/24/2023     Lab Results   Component Value Date    INR 1.1 05/24/2023     Lab " Results   Component Value Date    AST 29 05/24/2023    ALT 38 05/24/2023    BILITOT 0.3 05/24/2023    ALBUMIN 3.6 05/24/2023    ALKPHOS 101 05/24/2023    CREATININE 0.8 05/24/2023    BUN 18 05/24/2023     05/24/2023    K 4.4 05/24/2023     Results for orders placed during the hospital encounter of 05/24/23  US Abdomen Limited  CLINICAL HISTORY:  please include spleen for portal HTN assessment;  Chronic viral hepatitis C  TECHNIQUE:  Limited ultrasound of the right upper quadrant of the abdomen (including pancreas, liver, gallbladder, common bile duct, and right kidney) was performed.  COMPARISON:  None.    FINDINGS:  The visualized pancreas is unremarkable the gallbladder is within normal limits.  The common bile duct is normal caliber at 4 mm.  Sonographic Domingo sign is negative.  The IVC is normal caliber.  The liver is upper normal in size at 18 cm.  There is no focal hepatic lesion.  The spleen is normal in size at 12 cm.  There is no ascites.    Impression  Upper normal size liver and spleen.  Otherwise unremarkable exam.      ASSESSMENT        42 y.o. White male with:  1. CHRONIC HEPATITIS C, GENOTYPE ? - treatment naive  -- FibroScan F0-1  -- lacking Immunity to HAV     2. POSITIVE HBSAG  -- needs clarification    3. SUBSTANCE USE DISORDER      PLAN        1. Labs today  2. Discussed use of epclusa for HCV.  If labs do not indicate active HBV requiring antiviral will send script to [facility] and see pt back for f/u on treatment    Orders Placed This Encounter   Procedures    HEPATITIS B VIRAL DNA, QUANTITATIVE    Hepatitis B Surface Antigen    Hepatitis B Surface Ab, Qualitative    Hepatitis B Core Antibody, Total    Hepatitis B Core Antibody, IgM    Hepatitis B e Antigen    Hepatitis B e antibody       ______________________________________________________________________________  EDUCATION:  HCV RX  Discussed goal of HCV eradication to prevent progression of liver disease.  Discussed  use of Epclusa daily x 12 weeks w/ potential side effects of fatigue and headache.     Reviewed limitations on acid suppressant medications due to DDI w/ Epclusa:  -- Antacids - TUMS, must be  from Epclusa by 4 hours  -- H2 Receptor Antagonist - not taking  -- PPI - not recommended while on Epclusa  Patient instructed to contact me if experiencing acid related symptoms so further recommendations can be made regarding acid suppression therapy.      Herbal / alternative therapies must be discontinued  Discussed importance of medication adherence and risk of treatment failure / viral resistance if not adherent. Pt has verbalized understanding.    __________________________________________________________________    Duration of encounter: 23 min  This includes face-to-face time and non face-to-face time preparing to see the patient (eg, review of tests), obtaining and/or reviewing separately obtained history, documenting clinical information in the electronic or other health record, independently interpreting resultsand communicating results to the patient/family/caregiver, or care coordination.

## 2023-07-20 LAB
HBV CORE AB SERPL QL IA: NEGATIVE
HBV CORE IGM SERPL QL IA: NEGATIVE
HBV SURFACE AB SER QL IA: NEGATIVE
HBV SURFACE AB SERPL IA-ACNC: <5 MIU/ML
HBV SURFACE AG SERPL QL IA: POSITIVE

## 2023-07-21 LAB
HBV E AB SER QL: NONREACTIVE
HBV E AG SERPL QL IA: REACTIVE

## 2023-07-24 ENCOUNTER — TELEPHONE (OUTPATIENT)
Dept: HEPATOLOGY | Facility: CLINIC | Age: 43
End: 2023-07-24
Payer: COMMERCIAL

## 2023-07-24 DIAGNOSIS — R76.8 HEPATITIS B SURFACE ANTIGEN POSITIVE: Primary | ICD-10-CM

## 2023-07-24 NOTE — TELEPHONE ENCOUNTER
Pt had labs after recent visit in [place]:   07/18/23 14:46   HBe Ag REACTIVE !   Hep B C IgM Negative   Hep B Core Total Ab Negative   Hep B E Ab Nonreactive   Hep B S Ab Negative   Hepatitis B Surface Ag Positive !   Hepatitis B Surface Antibody Quantitative <5.0     HBV DNA - not done. Received msg from lab that it was cancelled    Pls call pt -  I ordered several HBV labs after his last visit to help me better clarify his HBV status  All but one were successfully run - it appears there may have been an error w/ one of them. ?perhaps not enough blood?  I'm not entirely sure, but this one lab is still needed.    I can tell him he definitely has HBV infection so we absolutely need to clarify this to then move forward w/ treating his liver    Pls r/s HBV DNA (he may want to return to [place] as his local lab was not successful last time)    mikael

## 2023-07-27 LAB
HBV DNA SERPL NAA+PROBE-ACNC: ABNORMAL IU/ML
HBV DNA SERPL NAA+PROBE-LOG IU: 8.23 LOG (10) IU/ML
HBV DNA SERPL QL NAA+PROBE: DETECTED

## 2023-07-28 ENCOUNTER — TELEPHONE (OUTPATIENT)
Dept: HEPATOLOGY | Facility: CLINIC | Age: 43
End: 2023-07-28
Payer: COMMERCIAL

## 2023-07-28 NOTE — TELEPHONE ENCOUNTER
7/18 labs reviewed:  HBV DNA > 181,000,000    Pls call pt  Tell him labs show he does have both Hep B and C Infections  We have to address the Hep B first so I can not send script for HCV meds yet    Schedule visit w/ me to discuss HBV and treatment    thanks

## 2023-07-31 NOTE — TELEPHONE ENCOUNTER
I spoke with patient and msg from PA Scheuermann relayed.  Appt with PA Scheuermann scheduled 8/11/23; reminder notice mailed.

## 2023-08-14 ENCOUNTER — TELEPHONE (OUTPATIENT)
Dept: HEPATOLOGY | Facility: CLINIC | Age: 43
End: 2023-08-14
Payer: COMMERCIAL

## 2023-08-14 NOTE — TELEPHONE ENCOUNTER
----- Message from Juan Carlos Dowd MA sent at 8/11/2023  3:26 PM CDT -----  Regarding: FW: Scheduling Request    ----- Message -----  From: Alexandrea Gatica  Sent: 8/11/2023   3:23 PM CDT  To: [facility] Hepatology Scheduling  Subject: Scheduling Request                                   Name Of Caller:    Mr. Lopes      Contact Preference:    [phone]      Nature of call:    Pt wants to reschedule his appt. He was not able to leave work.

## 2023-08-14 NOTE — TELEPHONE ENCOUNTER
Attempt made to reach patient.  Left message asking that he call hepatology back for rescheduling.

## 2023-08-15 ENCOUNTER — TELEPHONE (OUTPATIENT)
Dept: HEPATOLOGY | Facility: CLINIC | Age: 43
End: 2023-08-15
Payer: COMMERCIAL

## 2023-08-15 NOTE — TELEPHONE ENCOUNTER
Patient was a no-show for scheduled appt with PA Scheuermann on 8/11/23.  I spoke with him.  Appt scheduled again on 8/25/23; reminder notice mailed.  I reminded patient again that I was getting an out of network notice when scheduling his visit.

## 2023-09-06 ENCOUNTER — TELEPHONE (OUTPATIENT)
Dept: HEPATOLOGY | Facility: CLINIC | Age: 43
End: 2023-09-06
Payer: COMMERCIAL

## 2023-09-18 ENCOUNTER — TELEPHONE (OUTPATIENT)
Dept: HEPATOLOGY | Facility: CLINIC | Age: 43
End: 2023-09-18
Payer: COMMERCIAL

## 2023-09-18 NOTE — TELEPHONE ENCOUNTER
----- Message from Juan Carlos Dowd MA sent at 9/15/2023  3:07 PM CDT -----  Regarding: FW: Schedule appt  Contact: Moses    ----- Message -----  From: Antonietta Tay  Sent: 9/15/2023   2:02 PM CDT  To: [facility] Hepatology Scheduling  Subject: Schedule appt                                    Patient Status: Np        Scheduling Appt: From referral        Time/Date Preference:none        Contact Preference?: Moses   [phone] (home)              Additional Notes: pt is calling to have appt scheduled from a letter he received. Please advise.Requesting a call back.

## 2024-06-17 DIAGNOSIS — R73.03 PREDIABETES: ICD-10-CM
